# Patient Record
Sex: MALE | Race: WHITE | URBAN - METROPOLITAN AREA
[De-identification: names, ages, dates, MRNs, and addresses within clinical notes are randomized per-mention and may not be internally consistent; named-entity substitution may affect disease eponyms.]

---
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
## 2022-11-08 ENCOUNTER — OFFICE VISIT (OUTPATIENT)
Dept: FAMILY MEDICINE CLINIC | Age: 22
End: 2022-11-08
Payer: COMMERCIAL

## 2022-11-08 VITALS
OXYGEN SATURATION: 98 % | BODY MASS INDEX: 27.77 KG/M2 | DIASTOLIC BLOOD PRESSURE: 80 MMHG | RESPIRATION RATE: 18 BRPM | HEART RATE: 90 BPM | TEMPERATURE: 97.7 F | HEIGHT: 72 IN | SYSTOLIC BLOOD PRESSURE: 120 MMHG | WEIGHT: 205 LBS

## 2022-11-08 DIAGNOSIS — F32.A DEPRESSION, UNSPECIFIED DEPRESSION TYPE: Primary | ICD-10-CM

## 2022-11-08 DIAGNOSIS — Z13.220 LIPID SCREENING: ICD-10-CM

## 2022-11-08 DIAGNOSIS — Z13.1 DIABETES MELLITUS SCREENING: ICD-10-CM

## 2022-11-08 PROCEDURE — 99203 OFFICE O/P NEW LOW 30 MIN: CPT | Performed by: NURSE PRACTITIONER

## 2022-11-08 RX ORDER — CITALOPRAM 10 MG/1
10 TABLET ORAL DAILY
Qty: 30 TABLET | Refills: 5 | Status: SHIPPED | OUTPATIENT
Start: 2022-11-08

## 2022-11-08 SDOH — ECONOMIC STABILITY: FOOD INSECURITY: WITHIN THE PAST 12 MONTHS, YOU WORRIED THAT YOUR FOOD WOULD RUN OUT BEFORE YOU GOT MONEY TO BUY MORE.: NEVER TRUE

## 2022-11-08 SDOH — ECONOMIC STABILITY: FOOD INSECURITY: WITHIN THE PAST 12 MONTHS, THE FOOD YOU BOUGHT JUST DIDN'T LAST AND YOU DIDN'T HAVE MONEY TO GET MORE.: NEVER TRUE

## 2022-11-08 ASSESSMENT — PATIENT HEALTH QUESTIONNAIRE - PHQ9
5. POOR APPETITE OR OVEREATING: 2
1. LITTLE INTEREST OR PLEASURE IN DOING THINGS: 3
SUM OF ALL RESPONSES TO PHQ9 QUESTIONS 1 & 2: 6
3. TROUBLE FALLING OR STAYING ASLEEP: 3
7. TROUBLE CONCENTRATING ON THINGS, SUCH AS READING THE NEWSPAPER OR WATCHING TELEVISION: 1
10. IF YOU CHECKED OFF ANY PROBLEMS, HOW DIFFICULT HAVE THESE PROBLEMS MADE IT FOR YOU TO DO YOUR WORK, TAKE CARE OF THINGS AT HOME, OR GET ALONG WITH OTHER PEOPLE: 2
SUM OF ALL RESPONSES TO PHQ QUESTIONS 1-9: 18
9. THOUGHTS THAT YOU WOULD BE BETTER OFF DEAD, OR OF HURTING YOURSELF: 0
4. FEELING TIRED OR HAVING LITTLE ENERGY: 3
8. MOVING OR SPEAKING SO SLOWLY THAT OTHER PEOPLE COULD HAVE NOTICED. OR THE OPPOSITE, BEING SO FIGETY OR RESTLESS THAT YOU HAVE BEEN MOVING AROUND A LOT MORE THAN USUAL: 0
SUM OF ALL RESPONSES TO PHQ QUESTIONS 1-9: 18
2. FEELING DOWN, DEPRESSED OR HOPELESS: 3
6. FEELING BAD ABOUT YOURSELF - OR THAT YOU ARE A FAILURE OR HAVE LET YOURSELF OR YOUR FAMILY DOWN: 3
SUM OF ALL RESPONSES TO PHQ QUESTIONS 1-9: 18
SUM OF ALL RESPONSES TO PHQ QUESTIONS 1-9: 18

## 2022-11-08 ASSESSMENT — ENCOUNTER SYMPTOMS
DIARRHEA: 0
SHORTNESS OF BREATH: 0
COUGH: 0
NAUSEA: 0
ABDOMINAL PAIN: 0
EYE PAIN: 0
SORE THROAT: 0
BACK PAIN: 0
SINUS PAIN: 0
VOMITING: 0

## 2022-11-08 ASSESSMENT — SOCIAL DETERMINANTS OF HEALTH (SDOH): HOW HARD IS IT FOR YOU TO PAY FOR THE VERY BASICS LIKE FOOD, HOUSING, MEDICAL CARE, AND HEATING?: NOT HARD AT ALL

## 2022-11-08 NOTE — PATIENT INSTRUCTIONS
Patient Education        Depression Treatment: Care Instructions  Your Care Instructions     Depression is a condition that affects the way you feel, think, and act. It causes symptoms such as low energy, loss of interest in daily activities, and sadness or grouchiness that goes on for a long time. Depression is very common and affects men and women of all ages. Depression is a medical illness caused by changes in the natural chemicals in your brain. It is not a character flaw, and it does not mean that you are a bad or weak person. It does not mean that you are going crazy. It is important to know that depression can be treated. Medicines, counseling, and self-care can all help. Many people do not get help because they are embarrassed or think that they will get over the depression on their own. But some people do not get better without treatment. Follow-up care is a key part of your treatment and safety. Be sure to make and go to all appointments, and call your doctor if you are having problems. It's also a good idea to know your test results and keep a list of the medicines you take. How can you care for yourself at home? Learn about antidepressant medicines  Antidepressant medicines can improve or end the symptoms of depression. You may need to take the medicine for at least 6 months, and often longer. Keep taking your medicine even if you feel better. If you stop taking it too soon, your symptoms may come back or get worse. You may start to feel better within 1 to 3 weeks of taking antidepressant medicine. But it can take as many as 6 to 8 weeks to see more improvement. Talk to your doctor if you have problems with your medicine or if you do not notice any improvement after 3 weeks. Antidepressants can make you feel tired, dizzy, or nervous. Some people have dry mouth, constipation, headaches, sexual problems, an upset stomach, or diarrhea.  Many of these side effects are mild and go away on their own after you take the medicine for a few weeks. Some may last longer. Talk to your doctor if side effects bother you too much. You might be able to try a different medicine. If you are pregnant or breastfeeding, talk to your doctor about what medicines you can take. Learn about counseling  In many cases, counseling can work as well as medicines to treat mild to moderate depression. Counseling is done by licensed mental health providers, such as psychologists, social workers, and some types of nurses. It can be done in one-on-one sessions or in a group setting. Many people find group sessions helpful. Cognitive behavioral therapy is a type of counseling. In this treatment, you learn how to see and change unhelpful thinking styles that may be adding to your depression. Counseling and medicines often work well when used together. When should you call for help? Call 911 anytime you think you may need emergency care. For example, call if:    You feel you cannot stop from hurting yourself or someone else. Where to get help 24 hours a day, 7 days a week   If you or someone you know talks about suicide, self-harm, a mental health crisis, a substance use crisis, or any other kind of emotional distress, get help right away. You can:    Call the Suicide and Crisis Lifeline at 65. Call 2-993-780-GOHY (4-334.309.6594). Text HOME to 207871 to access the Crisis Text Line. Consider saving these numbers in your phone. Call your doctor now or seek immediate medical care if:    You hear voices. You feel much more depressed. Watch closely for changes in your health, and be sure to contact your doctor if:    You are having problems with your depression medicine. You are not getting better as expected. Where can you learn more? Go to https://natalya.[employer]. org and sign in to your "[employer] account.  Enter L062 in the [address] box to learn more about \"Depression Treatment: Care Instructions. \"     If you do not have an account, please click on the \"Sign Up Now\" link. Current as of: February 9, 2022               Content Version: 13.4  © 2006-2022 [employer]. Care instructions adapted under license by [facility] ([facility]). If you have questions about a medical condition or this instruction, always ask your healthcare professional. [address] any warranty or liability for your use of this information.

## 2022-11-08 NOTE — PROGRESS NOTES
Visit Information    Have you changed or started any medications since your last visit including any over-the-counter medicines, vitamins, or herbal medicines? no   Are you having any side effects from any of your medications? -  no  Have you stopped taking any of your medications? Is so, why? -  no    Have you seen any other physician or provider since your last visit? No  Have you had any other diagnostic tests since your last visit? No  Have you been seen in the emergency room and/or had an admission to a hospital since we last saw you? No  Have you had your routine dental cleaning in the past 6 months? no    Have you activated your [employer] account? If not, what are your barriers?  Yes     Patient Care Team:  GIOVANNI Mckeon - CNP as PCP - General (Certified Nurse Practitioner)    Medical History Review  Past Medical, Family, and Social History reviewed and does contribute to the patient presenting condition    Health Maintenance   Topic Date Due    COVID-19 Vaccine (1) Never done    Varicella vaccine (1 of 2 - 2-dose childhood series) Never done    HPV vaccine (1 - Male 2-dose series) Never done    HIV screen  Never done    Hepatitis C screen  Never done    Flu vaccine (1) Never done    Depression Screen  12/20/2022    DTaP/Tdap/Td vaccine (2 - Td or Tdap) 07/07/2032    Hepatitis A vaccine  Aged Out    Hib vaccine  Aged Out    Meningococcal (ACWY) vaccine  Aged Out    Pneumococcal 0-64 years Vaccine  Aged Out

## 2022-11-08 NOTE — PROGRESS NOTES
[address] [facility]  [address]  [address] [facility] [address]  Dept: [phone]  Dept Fax: [phone]    Joi Alonzo is a 23 y.o. male who presents today for his medicalconditions/complaints as noted below. Joi Alonzo is c/o of New Patient and Depression      HPI:       28-year-old male patient presents with concerns for new patient appointment. Patient has concerns with depression. Patient reports that he has been diagnosed and treated for depression for the past 2 to 3 years. Reports that he has done several rounds of therapy without significant improvement. Patient reports that symptoms have worsened over the past 1 to 2 years. Has not previously tried any medications. History reviewed. No pertinent past medical history. Current Outpatient Medications   Medication Sig Dispense Refill    citalopram (CELEXA) 10 MG tablet Take 1 tablet by mouth daily 30 tablet 5     No current facility-administered medications for this visit. No Known Allergies    Subjective:      Review of Systems   Constitutional:  Negative for chills and fatigue. HENT:  Negative for congestion, ear pain, sinus pain and sore throat. Eyes:  Negative for pain and visual disturbance. Respiratory:  Negative for cough and shortness of breath. Cardiovascular:  Negative for chest pain and palpitations. Gastrointestinal:  Negative for abdominal pain, diarrhea, nausea and vomiting. Genitourinary:  Negative for penile pain and testicular pain. Musculoskeletal:  Negative for back pain, joint swelling and neck pain. Skin:  Negative for rash. Neurological:  Negative for dizziness and light-headedness. Hematological:  Does not bruise/bleed easily. Psychiatric/Behavioral:  Positive for behavioral problems and dysphoric mood. All other systems reviewed and are negative.    :Objective     Physical Exam  Vitals and nursing note reviewed. Constitutional:       General: He is not in acute distress. Appearance: Normal appearance. He is not toxic-appearing. Cardiovascular:      Rate and Rhythm: Normal rate. Pulmonary:      Effort: Pulmonary effort is normal.      Breath sounds: Normal breath sounds. Skin:     General: Skin is warm and dry. Neurological:      General: No focal deficit present. Mental Status: He is alert and oriented to person, place, and time. /80 (Site: Right Upper Arm, Position: Sitting, Cuff Size: Medium Adult)   Pulse 90   Temp 97.7 °F (36.5 °C) (Tympanic)   Resp 18   Ht 6' (1.829 m)   Wt 205 lb (93 kg)   SpO2 98%   BMI 27.80 kg/m²     Lab Review   No visits with results within 6 Month(s) from this visit. Latest known visit with results is:   No results found for any previous visit. Assessment and Plan      1. Depression, unspecified depression type  -     CBC with Auto Differential; Future  -     Vitamin D 25 Hydroxy; Future  -     citalopram (CELEXA) 10 MG tablet; Take 1 tablet by mouth daily, Disp-30 tablet, R-5Normal  2. Lipid screening  -     CBC with Auto Differential; Future  -     Lipid Panel; Future  3. Diabetes mellitus screening  -     CBC with Auto Differential; Future  -     Comprehensive Metabolic Panel; Future         Depression screening reviewed. Discussed failure of counseling previously. recommend Celexa at 10 mg, med check 2 months, sooner prn  Labs ordered          No results found for this visit on 11/08/22. Return in about 2 months (around 1/8/2023), or if symptoms worsen or fail to improve. Orders Placed This Encounter   Medications    citalopram (CELEXA) 10 MG tablet     Sig: Take 1 tablet by mouth daily     Dispense:  30 tablet     Refill:  5        Patient given educational materials - see patient instructions. Discussed use, benefit, and side effects of prescribed medications. All patientquestions answered. Pt voiced understanding.     Patient given educational materials - see patient instructions. Discussed use, benefit, and side effects of prescribed medications. All patientquestions answered. Pt voiced understanding. This note was transcribed using dictation with Dragon services. Efforts were made to correct any errors but some words may be misinterpreted.     Patient assumes risks associated with failure to complete recommended testing and treatments in a timely manner    Electronically signed by GIOVANNI Cooley CNP on 11/8/2022at 2:08 PM

## 2022-11-09 ENCOUNTER — HOSPITAL ENCOUNTER (OUTPATIENT)
Age: 22
Setting detail: SPECIMEN
Discharge: HOME | End: 2022-11-09

## 2022-11-09 DIAGNOSIS — E78.5 HYPERLIPIDEMIA, UNSPECIFIED HYPERLIPIDEMIA TYPE: Primary | ICD-10-CM

## 2022-11-09 DIAGNOSIS — Z13.1 DIABETES MELLITUS SCREENING: ICD-10-CM

## 2022-11-09 DIAGNOSIS — F32.A DEPRESSION, UNSPECIFIED DEPRESSION TYPE: ICD-10-CM

## 2022-11-09 DIAGNOSIS — Z13.220 LIPID SCREENING: ICD-10-CM

## 2022-11-09 DIAGNOSIS — E55.9 VITAMIN D DEFICIENCY: ICD-10-CM

## 2022-11-09 LAB
ABSOLUTE EOS #: 0.14 K/UL (ref 0–0.44)
ABSOLUTE IMMATURE GRANULOCYTE: <0.03 K/UL (ref 0–0.3)
ABSOLUTE LYMPH #: 2.01 K/UL (ref 1.2–5.2)
ABSOLUTE MONO #: 0.66 K/UL (ref 0.1–1.4)
ALBUMIN SERPL-MCNC: 4.7 G/DL (ref 3.5–5.2)
ALBUMIN/GLOBULIN RATIO: 1.7 (ref 1–2.5)
ALP BLD-CCNC: 60 U/L (ref 40–129)
ALT SERPL-CCNC: 30 U/L (ref 5–41)
ANION GAP SERPL CALCULATED.3IONS-SCNC: 11 MMOL/L (ref 9–17)
AST SERPL-CCNC: 21 U/L
BASOPHILS # BLD: 1 % (ref 0–2)
BASOPHILS ABSOLUTE: 0.04 K/UL (ref 0–0.2)
BILIRUB SERPL-MCNC: 0.5 MG/DL (ref 0.3–1.2)
BUN BLDV-MCNC: 13 MG/DL (ref 6–20)
CALCIUM SERPL-MCNC: 9.5 MG/DL (ref 8.6–10.4)
CHLORIDE BLD-SCNC: 101 MMOL/L (ref 98–107)
CHOLESTEROL/HDL RATIO: 4.6
CHOLESTEROL: 196 MG/DL
CO2: 27 MMOL/L (ref 20–31)
CREAT SERPL-MCNC: 0.77 MG/DL (ref 0.7–1.2)
EOSINOPHILS RELATIVE PERCENT: 2 % (ref 1–4)
GFR SERPL CREATININE-BSD FRML MDRD: >60 ML/MIN/1.73M2
GLUCOSE BLD-MCNC: 84 MG/DL (ref 70–99)
HCT VFR BLD CALC: 46.2 % (ref 40.7–50.3)
HDLC SERPL-MCNC: 43 MG/DL
HEMOGLOBIN: 14.7 G/DL (ref 13–17)
IMMATURE GRANULOCYTES: 0 %
LDL CHOLESTEROL: 140 MG/DL (ref 0–130)
LYMPHOCYTES # BLD: 26 % (ref 25–45)
MCH RBC QN AUTO: 29.2 PG (ref 25.2–33.5)
MCHC RBC AUTO-ENTMCNC: 31.8 G/DL (ref 28.4–34.8)
MCV RBC AUTO: 91.8 FL (ref 82.6–102.9)
MONOCYTES # BLD: 8 % (ref 2–8)
NRBC AUTOMATED: 0 PER 100 WBC
PDW BLD-RTO: 12.6 % (ref 11.8–14.4)
PLATELET # BLD: 332 K/UL (ref 138–453)
PMV BLD AUTO: 9.7 FL (ref 8.1–13.5)
POTASSIUM SERPL-SCNC: 4.5 MMOL/L (ref 3.7–5.3)
RBC # BLD: 5.03 M/UL (ref 4.21–5.77)
SEG NEUTROPHILS: 63 % (ref 34–64)
SEGMENTED NEUTROPHILS ABSOLUTE COUNT: 5.02 K/UL (ref 1.8–8)
SODIUM BLD-SCNC: 139 MMOL/L (ref 135–144)
TOTAL PROTEIN: 7.4 G/DL (ref 6.4–8.3)
TRIGL SERPL-MCNC: 67 MG/DL
VITAMIN D 25-HYDROXY: 21.9 NG/ML
WBC # BLD: 7.9 K/UL (ref 4.5–13.5)

## 2022-11-09 RX ORDER — ERGOCALCIFEROL 1.25 MG/1
50000 CAPSULE ORAL WEEKLY
Qty: 12 CAPSULE | Refills: 0 | Status: SHIPPED | OUTPATIENT
Start: 2022-11-09

## 2023-01-09 ENCOUNTER — OFFICE VISIT (OUTPATIENT)
Dept: FAMILY MEDICINE CLINIC | Age: 23
End: 2023-01-09
Payer: COMMERCIAL

## 2023-01-09 VITALS
HEIGHT: 72 IN | DIASTOLIC BLOOD PRESSURE: 82 MMHG | SYSTOLIC BLOOD PRESSURE: 128 MMHG | RESPIRATION RATE: 16 BRPM | HEART RATE: 83 BPM | TEMPERATURE: 98.3 F | WEIGHT: 202 LBS | BODY MASS INDEX: 27.36 KG/M2 | OXYGEN SATURATION: 98 %

## 2023-01-09 DIAGNOSIS — E55.9 VITAMIN D DEFICIENCY: ICD-10-CM

## 2023-01-09 DIAGNOSIS — F32.A DEPRESSION, UNSPECIFIED DEPRESSION TYPE: Primary | ICD-10-CM

## 2023-01-09 PROCEDURE — 99213 OFFICE O/P EST LOW 20 MIN: CPT | Performed by: NURSE PRACTITIONER

## 2023-01-09 RX ORDER — MELATONIN
1000 DAILY
Qty: 90 TABLET | Refills: 1 | Status: SHIPPED | OUTPATIENT
Start: 2023-01-09

## 2023-01-09 RX ORDER — CITALOPRAM 20 MG/1
20 TABLET ORAL DAILY
Qty: 30 TABLET | Refills: 5 | Status: SHIPPED | OUTPATIENT
Start: 2023-01-09

## 2023-01-09 ASSESSMENT — PATIENT HEALTH QUESTIONNAIRE - PHQ9
2. FEELING DOWN, DEPRESSED OR HOPELESS: 2
SUM OF ALL RESPONSES TO PHQ QUESTIONS 1-9: 11
SUM OF ALL RESPONSES TO PHQ QUESTIONS 1-9: 11
9. THOUGHTS THAT YOU WOULD BE BETTER OFF DEAD, OR OF HURTING YOURSELF: 0
5. POOR APPETITE OR OVEREATING: 2
10. IF YOU CHECKED OFF ANY PROBLEMS, HOW DIFFICULT HAVE THESE PROBLEMS MADE IT FOR YOU TO DO YOUR WORK, TAKE CARE OF THINGS AT HOME, OR GET ALONG WITH OTHER PEOPLE: 2
6. FEELING BAD ABOUT YOURSELF - OR THAT YOU ARE A FAILURE OR HAVE LET YOURSELF OR YOUR FAMILY DOWN: 1
SUM OF ALL RESPONSES TO PHQ9 QUESTIONS 1 & 2: 3
SUM OF ALL RESPONSES TO PHQ QUESTIONS 1-9: 11
8. MOVING OR SPEAKING SO SLOWLY THAT OTHER PEOPLE COULD HAVE NOTICED. OR THE OPPOSITE, BEING SO FIGETY OR RESTLESS THAT YOU HAVE BEEN MOVING AROUND A LOT MORE THAN USUAL: 0
3. TROUBLE FALLING OR STAYING ASLEEP: 2
4. FEELING TIRED OR HAVING LITTLE ENERGY: 2
SUM OF ALL RESPONSES TO PHQ QUESTIONS 1-9: 11
7. TROUBLE CONCENTRATING ON THINGS, SUCH AS READING THE NEWSPAPER OR WATCHING TELEVISION: 1
1. LITTLE INTEREST OR PLEASURE IN DOING THINGS: 1

## 2023-01-09 ASSESSMENT — ENCOUNTER SYMPTOMS
SINUS PAIN: 0
BACK PAIN: 0
ABDOMINAL PAIN: 0
VOMITING: 0
DIARRHEA: 0
COUGH: 0
SHORTNESS OF BREATH: 0
NAUSEA: 0
EYE PAIN: 0
SORE THROAT: 0

## 2023-01-09 NOTE — PROGRESS NOTES
[address] [facility]  [address]  [address] [place] [address]  Dept: [phone]  Dept Fax: [phone]    Jennifer Jones is a 23 y.o. male who presents today for his medicalconditions/complaints as noted below. Jennifer Jones is c/o of Depression      HPI:     32-year-old male patient presents with concerns for follow up    Patient has concerns with depression. Patient reports that he has been diagnosed and treated for depression for the past 2 to 3 years. Started on celexa at 12, no signficant improvement at this time. Still having depressive symptoms, mood swings and anxious symptoms. Vit d deficiency, weekly replacement ongoing. Lipids borderline, working on diet. No past medical history on file. Current Outpatient Medications   Medication Sig Dispense Refill    vitamin D3 (CHOLECALCIFEROL) 25 MCG (1000 UT) TABS tablet Take 1 tablet by mouth daily 90 tablet 1    citalopram (CELEXA) 20 MG tablet Take 1 tablet by mouth daily 30 tablet 5    vitamin D (ERGOCALCIFEROL) 1.25 MG (11897 UT) CAPS capsule Take 1 capsule by mouth once a week 12 capsule 0     No current facility-administered medications for this visit. No Known Allergies    Subjective:      Review of Systems   Constitutional:  Negative for chills and fatigue. HENT:  Negative for congestion, ear pain, sinus pain and sore throat. Eyes:  Negative for pain and visual disturbance. Respiratory:  Negative for cough and shortness of breath. Cardiovascular:  Negative for chest pain and palpitations. Gastrointestinal:  Negative for abdominal pain, diarrhea, nausea and vomiting. Genitourinary:  Negative for penile pain and testicular pain. Musculoskeletal:  Negative for back pain, joint swelling and neck pain. Skin:  Negative for rash. Neurological:  Negative for dizziness and light-headedness. Hematological:  Does not bruise/bleed easily. Psychiatric/Behavioral:  Positive for behavioral problems. The patient is nervous/anxious. All other systems reviewed and are negative.    :Objective     Physical Exam  Vitals and nursing note reviewed. Constitutional:       Appearance: Normal appearance. Cardiovascular:      Rate and Rhythm: Normal rate. Pulmonary:      Effort: Pulmonary effort is normal.   Skin:     General: Skin is warm and dry. Neurological:      General: No focal deficit present. Mental Status: He is alert and oriented to person, place, and time.      /82 (Site: Right Upper Arm, Position: Sitting, Cuff Size: Medium Adult)   Pulse 83   Temp 98.3 °F (36.8 °C) (Tympanic)   Resp 16   Ht 6' (1.829 m)   Wt 202 lb (91.6 kg)   SpO2 98%   BMI 27.40 kg/m²     Lab Review   Hospital Outpatient Visit on 11/09/2022   Component Date Value    Vit D, 25-Hydroxy 11/09/2022 21.9 (A)     Cholesterol 11/09/2022 196     HDL 11/09/2022 43     LDL Cholesterol 11/09/2022 140 (A)     Chol/HDL Ratio 11/09/2022 4.6     Triglycerides 11/09/2022 67     Glucose 11/09/2022 84     BUN 11/09/2022 13     Creatinine 11/09/2022 0.77     Est, Glom Filt Rate 11/09/2022 >60     Calcium 11/09/2022 9.5     Sodium 11/09/2022 139     Potassium 11/09/2022 4.5     Chloride 11/09/2022 101     CO2 11/09/2022 27     Anion Gap 11/09/2022 11     Alkaline Phosphatase 11/09/2022 60     ALT 11/09/2022 30     AST 11/09/2022 21     Total Bilirubin 11/09/2022 0.5     Total Protein 11/09/2022 7.4     Albumin 11/09/2022 4.7     Albumin/Globulin Ratio 11/09/2022 1.7     WBC 11/09/2022 7.9     RBC 11/09/2022 5.03     Hemoglobin 11/09/2022 14.7     Hematocrit 11/09/2022 46.2     MCV 11/09/2022 91.8     MCH 11/09/2022 29.2     MCHC 11/09/2022 31.8     RDW 11/09/2022 12.6     Platelets 44/43/7846 332     MPV 11/09/2022 9.7     NRBC Automated 11/09/2022 0.0     Seg Neutrophils 11/09/2022 63     Lymphocytes 11/09/2022 26     Monocytes 11/09/2022 8     Eosinophils % 11/09/2022 2 Basophils 11/09/2022 1     Immature Granulocytes 11/09/2022 0     Segs Absolute 11/09/2022 5.02     Absolute Lymph # 11/09/2022 2.01     Absolute Mono # 11/09/2022 0.66     Absolute Eos # 11/09/2022 0.14     Basophils Absolute 11/09/2022 0.04     Absolute Immature Granul* 11/09/2022 <0.03        Assessment and Plan      1. Depression, unspecified depression type  -     citalopram (CELEXA) 20 MG tablet; Take 1 tablet by mouth daily, Disp-30 tablet, R-5Normal  -     External Referral To Counseling Services  2. Vitamin D deficiency  -     vitamin D3 (CHOLECALCIFEROL) 25 MCG (1000 UT) TABS tablet; Take 1 tablet by mouth daily, Disp-90 tablet, R-1Normal     Celexa increased  Referral to counseling  Vit d daily to follow weekly  Recheck on lipids in 3 months  F/u in 3 months, sooner prn          No results found for this visit on 01/09/23. Return in about 3 months (around 4/9/2023), or if symptoms worsen or fail to improve. Orders Placed This Encounter   Medications    vitamin D3 (CHOLECALCIFEROL) 25 MCG (1000 UT) TABS tablet     Sig: Take 1 tablet by mouth daily     Dispense:  90 tablet     Refill:  1    citalopram (CELEXA) 20 MG tablet     Sig: Take 1 tablet by mouth daily     Dispense:  30 tablet     Refill:  5        Patient given educational materials - see patient instructions. Discussed use, benefit, and side effects of prescribed medications. All patientquestions answered. Pt voiced understanding. Patient given educational materials - see patient instructions. Discussed use, benefit, and side effects of prescribed medications. All patientquestions answered. Pt voiced understanding. This note was transcribed using dictation with Dragon services. Efforts were made to correct any errors but some words may be misinterpreted.     Patient assumes risks associated with failure to complete recommended testing and treatments in a timely manner    Electronically signed by GIOVANNI Elizalde - CNP on 1/9/2023at 6:02 PM

## 2023-01-09 NOTE — PROGRESS NOTES
Visit Information    Have you changed or started any medications since your last visit including any over-the-counter medicines, vitamins, or herbal medicines? no   Are you having any side effects from any of your medications? -  no  Have you stopped taking any of your medications? Is so, why? -  no    Have you seen any other physician or provider since your last visit? No  Have you had any other diagnostic tests since your last visit? No  Have you been seen in the emergency room and/or had an admission to a hospital since we last saw you? No  Have you had your routine dental cleaning in the past 6 months? yes -     Have you activated your [employer] account? If not, what are your barriers?  Yes     Patient Care Team:  GIOVANNI Salinas CNP as PCP - General (Certified Nurse Practitioner)  GIOVANNI Salinas CNP as PCP - [facility] Provider    Medical History Review  Past Medical, Family, and Social History reviewed and does contribute to the patient presenting condition    Health Maintenance   Topic Date Due    COVID-19 Vaccine (1) Never done    Varicella vaccine (1 of 2 - 2-dose childhood series) Never done    HPV vaccine (1 - Male 2-dose series) Never done    HIV screen  Never done    Hepatitis C screen  Never done    Flu vaccine (1) Never done    Depression Monitoring  11/08/2023    DTaP/Tdap/Td vaccine (2 - Td or Tdap) 07/07/2032    Hepatitis A vaccine  Aged Out    Hib vaccine  Aged Out    Meningococcal (ACWY) vaccine  Aged Out    Pneumococcal 0-64 years Vaccine  Aged Out

## 2023-01-09 NOTE — PATIENT INSTRUCTIONS
Patient Education        Depression Treatment: Care Instructions  Your Care Instructions     Depression is a condition that affects the way you feel, think, and act. It causes symptoms such as low energy, loss of interest in daily activities, and sadness or grouchiness that goes on for a long time. Depression is very common and affects men and women of all ages. Depression is a medical illness caused by changes in the natural chemicals in your brain. It is not a character flaw, and it does not mean that you are a bad or weak person. It does not mean that you are going crazy. It is important to know that depression can be treated. Medicines, counseling, and self-care can all help. Many people do not get help because they are embarrassed or think that they will get over the depression on their own. But some people do not get better without treatment. Follow-up care is a key part of your treatment and safety. Be sure to make and go to all appointments, and call your doctor if you are having problems. It's also a good idea to know your test results and keep a list of the medicines you take. How can you care for yourself at home? Learn about antidepressant medicines  Antidepressant medicines can improve or end the symptoms of depression. You may need to take the medicine for at least 6 months, and often longer. Keep taking your medicine even if you feel better. If you stop taking it too soon, your symptoms may come back or get worse. You may start to feel better within 1 to 3 weeks of taking antidepressant medicine. But it can take as many as 6 to 8 weeks to see more improvement. Talk to your doctor if you have problems with your medicine or if you do not notice any improvement after 3 weeks. Antidepressants can make you feel tired, dizzy, or nervous. Some people have dry mouth, constipation, headaches, sexual problems, an upset stomach, or diarrhea.  Many of these side effects are mild and go away on their own after you take the medicine for a few weeks. Some may last longer. Talk to your doctor if side effects bother you too much. You might be able to try a different medicine. If you are pregnant or breastfeeding, talk to your doctor about what medicines you can take. Learn about counseling  In many cases, counseling can work as well as medicines to treat mild to moderate depression. Counseling is done by licensed mental health providers, such as psychologists, social workers, and some types of nurses. It can be done in one-on-one sessions or in a group setting. Many people find group sessions helpful. Cognitive behavioral therapy is a type of counseling. In this treatment, you learn how to see and change unhelpful thinking styles that may be adding to your depression. Counseling and medicines often work well when used together. When should you call for help? Call 911 anytime you think you may need emergency care. For example, call if:    You feel you cannot stop from hurting yourself or someone else. Where to get help 24 hours a day, 7 days a week   If you or someone you know talks about suicide, self-harm, a mental health crisis, a substance use crisis, or any other kind of emotional distress, get help right away. You can:    Call the Suicide and Crisis Lifeline at 65. Call 1-800-273-talk (3-584.242.6282). Text HOME to 199201 to access the Crisis Text Line. Consider saving these numbers in your phone. Call your doctor now or seek immediate medical care if:    You hear voices. You feel much more depressed. Watch closely for changes in your health, and be sure to contact your doctor if:    You are having problems with your depression medicine. You are not getting better as expected. Where can you learn more? Go to http://www.woods.com/ and enter G693 to learn more about \"Depression Treatment: Care Instructions. \"  Current as of: February 9, 2022               Content Version: 13.5  © 2006-2022 [employer]. Care instructions adapted under license by [facility] ([facility]). If you have questions about a medical condition or this instruction, always ask your healthcare professional. [address] any warranty or liability for your use of this information.

## 2023-01-26 ENCOUNTER — TELEPHONE (OUTPATIENT)
Dept: FAMILY MEDICINE CLINIC | Age: 23
End: 2023-01-26

## 2023-01-26 NOTE — TELEPHONE ENCOUNTER
----- Message from April Riki sent at 1/26/2023  4:08 PM EST -----  Subject: Message to Provider    QUESTIONS  Information for Provider? patient would like to change his provider to   Eliel Rust PA-C. per patient he felt certain mental health issues he   has weren't addressed at his last appointment and he would like to   establish with her. please call patient back to advise, thank you  ---------------------------------------------------------------------------  --------------  [facility]  [phone]; OK to leave message on voicemail  ---------------------------------------------------------------------------  --------------  SCRIPT ANSWERS  Relationship to Patient?  Self

## 2023-02-03 DIAGNOSIS — E55.9 VITAMIN D DEFICIENCY: ICD-10-CM

## 2023-02-03 RX ORDER — ERGOCALCIFEROL 1.25 MG/1
CAPSULE ORAL
Qty: 12 CAPSULE | Refills: 0 | OUTPATIENT
Start: 2023-02-03

## 2023-02-09 ENCOUNTER — OFFICE VISIT (OUTPATIENT)
Dept: FAMILY MEDICINE CLINIC | Age: 23
End: 2023-02-09
Payer: COMMERCIAL

## 2023-02-09 VITALS
WEIGHT: 207 LBS | SYSTOLIC BLOOD PRESSURE: 118 MMHG | HEIGHT: 72 IN | BODY MASS INDEX: 28.04 KG/M2 | HEART RATE: 84 BPM | OXYGEN SATURATION: 98 % | DIASTOLIC BLOOD PRESSURE: 80 MMHG

## 2023-02-09 DIAGNOSIS — F32.A DEPRESSION, UNSPECIFIED DEPRESSION TYPE: Primary | ICD-10-CM

## 2023-02-09 DIAGNOSIS — Z81.8 FAMILY HISTORY OF BIPOLAR DISORDER: ICD-10-CM

## 2023-02-09 PROCEDURE — 99213 OFFICE O/P EST LOW 20 MIN: CPT | Performed by: PHYSICIAN ASSISTANT

## 2023-02-09 SDOH — ECONOMIC STABILITY: INCOME INSECURITY: HOW HARD IS IT FOR YOU TO PAY FOR THE VERY BASICS LIKE FOOD, HOUSING, MEDICAL CARE, AND HEATING?: NOT HARD AT ALL

## 2023-02-09 SDOH — ECONOMIC STABILITY: FOOD INSECURITY: WITHIN THE PAST 12 MONTHS, YOU WORRIED THAT YOUR FOOD WOULD RUN OUT BEFORE YOU GOT MONEY TO BUY MORE.: NEVER TRUE

## 2023-02-09 SDOH — ECONOMIC STABILITY: HOUSING INSECURITY
IN THE LAST 12 MONTHS, WAS THERE A TIME WHEN YOU DID NOT HAVE A STEADY PLACE TO SLEEP OR SLEPT IN A SHELTER (INCLUDING NOW)?: NO

## 2023-02-09 SDOH — ECONOMIC STABILITY: FOOD INSECURITY: WITHIN THE PAST 12 MONTHS, THE FOOD YOU BOUGHT JUST DIDN'T LAST AND YOU DIDN'T HAVE MONEY TO GET MORE.: NEVER TRUE

## 2023-02-09 ASSESSMENT — ENCOUNTER SYMPTOMS: COLOR CHANGE: 0

## 2023-02-09 NOTE — PROGRESS NOTES
[facility]  [address]  [address] [place] [id number]  Dept: [phone]  Dept Fax: [phone]    Josefina Jama is a 23 y.o. male who presents today for his medical conditions/complaintsas noted below. Josefina Jama is c/o of   Chief Complaint   Patient presents with    Depression     Changing providers         HPI:     HPI    Patient is here today with his with and young daughter. He has been dealing with depression and is presently on celexa-since Nov 2022. He states this has helped out the sadness but has not helped anger and his ability to easily change his mood. Wife states he often quickly changes his mood and often is unprompted. He is concerned he may have bipolar disease. He states strong family history of this: maternal grandmother, paternal aunt, father with undiagnosed ? Jayme Cain Patient denies any suicidal or homicidal thoughts. No results found for: LABA1C          ( goal A1Cis < 7)   No results found for: LABMICR  LDL Cholesterol (mg/dL)   Date Value   11/09/2022 140 (H)       (goal LDL is <100)   AST (U/L)   Date Value   11/09/2022 21     ALT (U/L)   Date Value   11/09/2022 30     BUN (mg/dL)   Date Value   11/09/2022 13     BP Readings from Last 3 Encounters:   02/09/23 118/80   01/09/23 128/82   11/08/22 120/80          (goal 120/80)    History reviewed. No pertinent past medical history. History reviewed. No pertinent surgical history. History reviewed. No pertinent family history. Social History     Tobacco Use    Smoking status: Never    Smokeless tobacco: Never   Substance Use Topics    Alcohol use: Never      Current Outpatient Medications   Medication Sig Dispense Refill    vitamin D3 (CHOLECALCIFEROL) 25 MCG (1000 UT) TABS tablet Take 1 tablet by mouth daily 90 tablet 1    citalopram (CELEXA) 20 MG tablet Take 1 tablet by mouth daily 30 tablet 5     No current facility-administered medications for this visit. No Known Allergies    Health Maintenance   Topic Date Due    COVID-19 Vaccine (1) Never done    Varicella vaccine (1 of 2 - 2-dose childhood series) Never done    HPV vaccine (1 - Male 2-dose series) Never done    HIV screen  Never done    Hepatitis C screen  Never done    Flu vaccine (1) Never done    Depression Monitoring  01/09/2024    DTaP/Tdap/Td vaccine (2 - Td or Tdap) 07/07/2032    Hepatitis A vaccine  Aged Out    Hib vaccine  Aged Out    Meningococcal (ACWY) vaccine  Aged Out    Pneumococcal 0-64 years Vaccine  Aged Out       Subjective:     Review of Systems   Constitutional:  Negative for fatigue, fever and unexpected weight change. Skin:  Negative for color change and pallor. Neurological:  Negative for weakness. Psychiatric/Behavioral:  Positive for sleep disturbance. Negative for agitation, behavioral problems, confusion, decreased concentration, self-injury and suicidal ideas. The patient is not nervous/anxious. Depression improved. Still has episodes of anger. Has not been sleeping well over the last 5 to 6 years. Frequent waking     Objective:     Physical Exam  Vitals and nursing note reviewed. Constitutional:       General: He is not in acute distress. Appearance: Normal appearance. He is well-developed. He is not ill-appearing. HENT:      Head: Normocephalic and atraumatic. Skin:     General: Skin is warm and dry. Coloration: Skin is not pale. Findings: No erythema or rash. Neurological:      Mental Status: He is alert and oriented to person, place, and time. Psychiatric:         Mood and Affect: Mood normal.         Behavior: Behavior normal.         Thought Content: Thought content normal.         Judgment: Judgment normal.      Comments: Pleasant, talkative, good eye contact. Dressed well and communicates well in conversation with myself and his wife.      /80 (Site: Right Upper Arm, Position: Sitting, Cuff Size: Large Adult)   Pulse 84   Ht 6' (1.829 m)   Wt 207 lb (93.9 kg)   SpO2 98%   BMI 28.07 kg/m²     Assessment:       Diagnosis Orders   1. Depression, unspecified depression type        2. Family history of bipolar disorder                  Plan:      Return in about 4 months (around 6/9/2023) for med review. Patient and I reviewed current medication. He does feel the Celexa has helped some. He would like to continue the present dose. No refills needed at this time. He is interested in seeing a psychiatrist at this time to discuss potential of seeing bipolar tendencies. I do think this is reasonable at this time as he does have strong family history of psychiatric illness as well. I gave him the option for local referral versus using [facility] in [place]. He was most interested in [facility].  Name and phone number was given to him, no referral is needed. Patient will call to schedule  Patient and wife agreed with treatment plan  We will plan recheck in 4 months, sooner as needed      Patient given educational materials - see patient instructions. Discussed use, benefit, and side effects of prescribed medications. All patientquestions answered. Pt voiced understanding. Reviewed health maintenance. Instructedto continue current medications, diet and exercise. Patient agreed with treatmentplan. Follow up as directed. Please note that this chart was generated using voice recognition Dragon dictation software. Although every effort was made to ensure the accuracy of this automated transcription, some errors in transcription may have occurred.      Electronically signed by Hitesh Alcaraz PA-C on 2/9/2023 at 2:04 PM

## 2023-02-09 NOTE — PROGRESS NOTES
Visit Information    Have you changed or started any medications since your last visit including any over-the-counter medicines, vitamins, or herbal medicines? no   Are you having any side effects from any of your medications? -  no  Have you stopped taking any of your medications? Is so, why? -  no    Have you seen any other physician or provider since your last visit? No  Have you had any other diagnostic tests since your last visit? No  Have you been seen in the emergency room and/or had an admission to a hospital since we last saw you? No  Have you had your routine dental cleaning in the past 6 months? no    Have you activated your "[employer] account? If not, what are your barriers?  Yes     Patient Care Team:  Meng Pickering PA-C as PCP - General (Physician Assistant)  GIOVANNI Wright CNP as PCP - Empaneled Provider    Medical History Review  Past Medical, Family, and Social History reviewed and  contribute to the patient presenting condition    Health Maintenance   Topic Date Due    COVID-19 Vaccine (1) Never done    Varicella vaccine (1 of 2 - 2-dose childhood series) Never done    HPV vaccine (1 - Male 2-dose series) Never done    HIV screen  Never done    Hepatitis C screen  Never done    Flu vaccine (1) Never done    Depression Monitoring  01/09/2024    DTaP/Tdap/Td vaccine (2 - Td or Tdap) 07/07/2032    Hepatitis A vaccine  Aged Out    Hib vaccine  Aged Out    Meningococcal (ACWY) vaccine  Aged Out    Pneumococcal 0-64 years Vaccine  Aged Out

## 2024-12-05 ENCOUNTER — OFFICE VISIT (OUTPATIENT)
Dept: PRIMARY CARE CLINIC | Age: 24
End: 2024-12-05
Payer: COMMERCIAL

## 2024-12-05 VITALS
TEMPERATURE: 101.2 F | HEART RATE: 128 BPM | OXYGEN SATURATION: 100 % | DIASTOLIC BLOOD PRESSURE: 78 MMHG | SYSTOLIC BLOOD PRESSURE: 123 MMHG

## 2024-12-05 DIAGNOSIS — J22 LOWER RESPIRATORY INFECTION (E.G., BRONCHITIS, PNEUMONIA, PNEUMONITIS, PULMONITIS): Primary | ICD-10-CM

## 2024-12-05 DIAGNOSIS — R06.02 SOB (SHORTNESS OF BREATH): ICD-10-CM

## 2024-12-05 PROCEDURE — 99213 OFFICE O/P EST LOW 20 MIN: CPT

## 2024-12-05 RX ORDER — FLUTICASONE PROPIONATE 50 MCG
SPRAY, SUSPENSION (ML) NASAL
COMMUNITY
Start: 2024-11-06 | End: 2024-12-05

## 2024-12-05 RX ORDER — AZITHROMYCIN 250 MG/1
TABLET, FILM COATED ORAL
Qty: 6 TABLET | Refills: 0 | Status: SHIPPED | OUTPATIENT
Start: 2024-12-05 | End: 2024-12-15

## 2024-12-05 RX ORDER — BENZONATATE 200 MG/1
200 CAPSULE ORAL 3 TIMES DAILY PRN
Qty: 30 CAPSULE | Refills: 0 | Status: SHIPPED | OUTPATIENT
Start: 2024-12-05 | End: 2024-12-15

## 2024-12-05 RX ORDER — PREDNISONE 20 MG/1
20 TABLET ORAL 2 TIMES DAILY
Qty: 10 TABLET | Refills: 0 | Status: SHIPPED | OUTPATIENT
Start: 2024-12-05 | End: 2024-12-10

## 2024-12-05 RX ORDER — LORATADINE, PSEUDOEPHEDRINE SULFATE 5; 120 MG/1; MG/1
1 TABLET, FILM COATED, EXTENDED RELEASE ORAL EVERY 12 HOURS
COMMUNITY
Start: 2024-11-06 | End: 2024-12-05

## 2024-12-05 RX ORDER — ALBUTEROL SULFATE 90 UG/1
2 INHALANT RESPIRATORY (INHALATION) 4 TIMES DAILY PRN
Qty: 18 G | Refills: 1 | Status: SHIPPED | OUTPATIENT
Start: 2024-12-05

## 2024-12-05 ASSESSMENT — ENCOUNTER SYMPTOMS
EYE REDNESS: 0
SHORTNESS OF BREATH: 1
CHEST TIGHTNESS: 1
EYE PAIN: 0
COUGH: 1
EYE ITCHING: 0
SORE THROAT: 1
WHEEZING: 1

## 2024-12-05 NOTE — PROGRESS NOTES
[facility]  [address]  [facility] [address]  Dept: [phone]  Dept Fax: [phone]    Elkin Noel is a 24 y.o. male who presents to the urgent care today for his medical conditions/complaints as notedbelow.  Elkin Noel is c/o of Cough (Productive with green mucus x a couple wks then developed sob, chest congestion, body aches started today - states was with cousin on [place] who was diagnosed with pneumonia)      HPI:     Patient presents to the [facility] for evaluation of a cough, onset 2 weeks. Today he is expirencing SOB with chest tightness and chest congestion.     Patient Care Team:  Christiane Salinas PA-C as PCP - General (Physician Assistant)  Christiane Salinas PA-C as PCP - Empaneled Provider      Cough  This is a new problem. The current episode started 1 to 4 weeks ago (2 weeks). The problem has been rapidly worsening. The problem occurs constantly. The cough is Productive of sputum. Associated symptoms include chills, ear pain, a fever, headaches, myalgias, postnasal drip, a sore throat, shortness of breath, sweats and wheezing. Pertinent negatives include no ear congestion, eye redness, nasal congestion, rash or weight loss. He has tried nothing for the symptoms. There is no history of asthma, bronchitis, COPD or pneumonia.       History reviewed. No pertinent past medical history.     Current Outpatient Medications   Medication Sig Dispense Refill    azithromycin (ZITHROMAX) 250 MG tablet 500mg on day 1 followed by 250mg on days 2 - 5 6 tablet 0    benzonatate (TESSALON) 200 MG capsule Take 1 capsule by mouth 3 times daily as needed for Cough 30 capsule 0    predniSONE (DELTASONE) 20 MG tablet Take 1 tablet by mouth 2 times daily for 5 days 10 tablet 0    albuterol sulfate HFA (VENTOLIN HFA) 108 (90 Base) MCG/ACT inhaler Inhale 2 puffs into the lungs 4 times daily as needed for Wheezing 18 g